# Patient Record
Sex: MALE | Race: OTHER | Employment: FULL TIME
[De-identification: names, ages, dates, MRNs, and addresses within clinical notes are randomized per-mention and may not be internally consistent; named-entity substitution may affect disease eponyms.]

---

## 2022-04-01 ENCOUNTER — NURSE TRIAGE (OUTPATIENT)
Dept: OTHER | Facility: CLINIC | Age: 32
End: 2022-04-01

## 2022-04-01 NOTE — TELEPHONE ENCOUNTER
Received call from Orlando Yi at [facility] with [employer] Complaint; Patient with Red Flag Complaint requesting to establish care with PCP. Subjective: Caller states \"Wrist pain\"     Current Symptoms: Nunu Bautista on 3/30/22, C/O right wrist pain and some swelling    Onset: 2 days ago; gradual    Associated Symptoms: NA    Pain Severity: 8/10; pain; intermittent    Temperature: denies fever    What has been tried: Ibuprofen and ice pack    LMP: NA Pregnant: NA    Recommended disposition: See PCP within 3 Days. UCC as backup plan. Care advice provided, patient verbalizes understanding; denies any other questions or concerns; instructed to call back for any new or worsening symptoms. Patient/Caller agrees with recommended disposition; writer provided warm transfer to Dione Blevins at [facility] for appointment scheduling    Attention Provider: Thank you for allowing me to participate in the care of your patient. The patient was connected to triage in response to information provided to the [facility]. Please do not respond through this encounter as the response is not directed to a shared pool.       Reason for Disposition   Wrist or hand injury is main concern   [1] After 3 days AND [2] pain not improving    Protocols used: ARM INJURY-ADULT-AH, HAND AND WRIST INJURY-ADULT-AH